# Patient Record
Sex: MALE | Race: ASIAN | Employment: FULL TIME | URBAN - METROPOLITAN AREA
[De-identification: names, ages, dates, MRNs, and addresses within clinical notes are randomized per-mention and may not be internally consistent; named-entity substitution may affect disease eponyms.]

---

## 2017-02-24 ENCOUNTER — OFFICE VISIT (OUTPATIENT)
Dept: FAMILY MEDICINE CLINIC | Facility: CLINIC | Age: 52
End: 2017-02-24

## 2017-02-24 VITALS
TEMPERATURE: 98 F | OXYGEN SATURATION: 98 % | BODY MASS INDEX: 23.3 KG/M2 | WEIGHT: 145 LBS | HEART RATE: 70 BPM | HEIGHT: 66 IN

## 2017-02-24 DIAGNOSIS — J00 ACUTE NASOPHARYNGITIS: Primary | ICD-10-CM

## 2017-02-24 PROCEDURE — 99202 OFFICE O/P NEW SF 15 MIN: CPT | Performed by: FAMILY MEDICINE

## 2017-02-24 NOTE — PROGRESS NOTES
CHIEF COMPLAINT:   Patient presents with:  Sore Throat: 10 days. not getting better. slightly worse this am.  pain with swallowing. no pain with talking. eating ok. 5/10. no fever. nasal congestion off and on, cough off and on for 2 days.        HPI:   Georgina Pena Non-tender .   EYES: conjunctiva clear, EOM intact  EARS: TM's pearly, no bulging, noretraction,no fluid, bony landmarks present  NOSE: Nostrils patent, no visible nasal discharge, nasal mucosa pink and boggy  THROAT: Oral mucosa pink, moist. Posterior orop

## 2017-02-24 NOTE — PATIENT INSTRUCTIONS
Most viral illnesses get worse for the first 3-5 days, then plateau and improve gradually over the next 3-5 days. Monitor symptoms for now. Use otc meds for comfort as needed-- claritin/zyrtec once daily.    Warm saltwater gargles, netti pot, warm tea w

## 2020-07-01 PROBLEM — M25.311 SHOULDER INSTABILITY, RIGHT: Status: ACTIVE | Noted: 2020-07-01

## 2020-07-01 PROBLEM — M25.522 PAIN OF BOTH ELBOWS: Status: ACTIVE | Noted: 2020-07-01

## 2020-07-01 PROBLEM — M25.521 PAIN OF BOTH ELBOWS: Status: ACTIVE | Noted: 2020-07-01

## 2021-11-29 PROBLEM — M25.522 PAIN OF BOTH ELBOWS: Status: RESOLVED | Noted: 2020-07-01 | Resolved: 2021-11-29

## 2021-11-29 PROBLEM — M25.521 PAIN OF BOTH ELBOWS: Status: RESOLVED | Noted: 2020-07-01 | Resolved: 2021-11-29

## 2021-11-29 PROBLEM — M25.311 SHOULDER INSTABILITY, RIGHT: Status: RESOLVED | Noted: 2020-07-01 | Resolved: 2021-11-29

## (undated) NOTE — MR AVS SNAPSHOT
[facility] [place]  [address] [facility] [address]  [phone]               Thank you for choosing us for your health care visit with Aidan James PA-C. We are glad to serve you and happy to provide you with this summary of your visit. office, you can view your past visit information in [employer] by going to Visits < Visit Summaries. [employer] questions? Call [phone] for help. [employer] is NOT to be used for urgent needs. For medical emergencies, dial 911.            Visit [facility]